# Patient Record
(demographics unavailable — no encounter records)

---

## 2020-02-19 NOTE — NUR
PT PLACED IN GOWN.  CXR ORDERED.  PT R/O SOB, PRODUCTIVE COUGH FOR SEVERAL 
WEEKS.  PT STATES DIZZINESS ONSET YESTERDAY.  BP CUFF, PULSE OX IN PLACE.  CALL 
LIGHT WITHIN REACH, WARM BLANKET PROVIDED.  VSS, BP IMPROVED FROM 
TRIAGE/UPDATED IN COMPUTER.